# Patient Record
Sex: MALE | Race: WHITE | URBAN - METROPOLITAN AREA
[De-identification: names, ages, dates, MRNs, and addresses within clinical notes are randomized per-mention and may not be internally consistent; named-entity substitution may affect disease eponyms.]

---

## 2018-06-01 ENCOUNTER — TELEPHONE (OUTPATIENT)
Dept: FAMILY MEDICINE | Facility: CLINIC | Age: 43
End: 2018-06-01

## 2018-06-01 NOTE — TELEPHONE ENCOUNTER
Reason for Call: Request for an order or referral:    Order or referral being requested: Physical Therapy    Date needed: as soon as possible    Has the patient been seen by the PCP for this problem? NO    Additional comments:    Phone number Patient can be reached at:  Home number on file mother Perdomo [phone]    Best Time:  Anytime     Can we leave a detailed message on this number?  YES    Call taken on 6/1/2018 at 2:01 PM by Alona Douglas

## 2018-06-04 NOTE — TELEPHONE ENCOUNTER
Scheduled patient for Friday 6/8 at 3 pm.    The patient was satisfied with this answer and agreed to the plan.

## 2018-06-12 ENCOUNTER — OFFICE VISIT (OUTPATIENT)
Dept: ORTHOPEDICS | Facility: CLINIC | Age: 43
End: 2018-06-12
Payer: COMMERCIAL

## 2018-06-12 VITALS
DIASTOLIC BLOOD PRESSURE: 70 MMHG | WEIGHT: 160 LBS | HEIGHT: 70 IN | BODY MASS INDEX: 22.9 KG/M2 | SYSTOLIC BLOOD PRESSURE: 111 MMHG

## 2018-06-12 DIAGNOSIS — Z98.890 H/O LUMBOSACRAL SPINE SURGERY: ICD-10-CM

## 2018-06-12 DIAGNOSIS — M54.50 CHRONIC MIDLINE LOW BACK PAIN WITHOUT SCIATICA: Primary | ICD-10-CM

## 2018-06-12 DIAGNOSIS — G89.29 CHRONIC MIDLINE LOW BACK PAIN WITHOUT SCIATICA: Primary | ICD-10-CM

## 2018-06-12 DIAGNOSIS — M51.369 DDD (DEGENERATIVE DISC DISEASE), LUMBAR: ICD-10-CM

## 2018-06-12 PROCEDURE — 99213 OFFICE O/P EST LOW 20 MIN: CPT | Performed by: FAMILY MEDICINE

## 2018-06-12 ASSESSMENT — ENCOUNTER SYMPTOMS
FOCAL WEAKNESS: 0
TINGLING: 0
BACK PAIN: 1
SENSORY CHANGE: 0

## 2018-06-12 NOTE — LETTER
2018         RE: Williams Bowen  [address]  [place] [address]        Dear Colleague,    Thank you for referring your patient, Williams Bowen, to the [facility]. Please see a copy of my visit note below.            Physical Exam  There were no vitals taken for this visit.  Constitutional:well-developed, well-nourished, and in no distress.   Cardiovascular: Intact distal pulses.    Neurological: alert. Gait normal.   Skin: Skin is warm and dry.   Psychiatric: Mood and affect normal.     diastasis recti noted with changes in posture.  Hyperlordotic posture    Results for orders placed or performed in visit on 14   MRI Imaging - HIM Scan    Narrative    MRI OF THE LUMBAR SPINE- [facility]       Impression      [facility]  Patient: Williams Bowen     : 1975    Sex: Male    Phone: [phone]    [facility] MRN: [id number]   Group MRN: [id number] EDIT       Exam Date: 2014    Referring Physician: Derek Dao M.D.       Phone: [phone]    Fax: [phone]            EXAM:  MRI OF THE LUMBAR SPINE    CLINICAL INFORMATION: Bilateral low back and anterior thigh pain.   History of previous surgery on 12/10/2010.       TECHNICAL INFORMATION: T1, T2 FSE and STIR sagittal thin sections  through the lumbar spine with T2 FSE Coronal sections, and T1/T2  FSE axial sections at selected levels.  Comparison with  04/10/2012.      INTERPRETATION: Lordotic alignment of five lumbar-type vertebrae.   No scoliosis, spondylolysis or spondylolisthesis.  Sacrum and  sacroiliac joints are unremarkable.       L5-S1: Moderate disc degeneration with a laminotomy defect on the  left.  A 5 mm broad-based central and left paracentral disc  herniation mildly deforms the ventral dural sac and abuts the  left S1 nerve root without nerve root displacement or  compression.  Moderate foraminal stenosis bilaterally.  Mild  right facet arthrosis.      L4-5: Mild disc  degeneration with dorsal annular fissure/bulging,  mild relative narrowing of the central canal and mild narrowing  of the right neural foramen.  Normal facet joints.     L3-4 through T11-12: Normal intervertebral disc at each level  with mild facet arthrosis on the left at L3-4 and no stenosis or  impingement.      Normal signal intensity within the distal conus medullaris.  No  intradural mass and no arachnoidal adhesions.     Signal within the vertebral marrow spaces is unremarkable with  mild discogenic marrow edema at L5-S1.  No destructive bone  lesion and no paraspinous soft tissue mass.      CONCLUSION: L5-S1 and L4-5 disc degeneration with a laminotomy  defect on the left at L5-S1 and specific findings as follows:  1. Moderate L5-S1 disc degeneration with a 5 mm broad-based  central/left paracentral disc herniation, dural sac deformity and  left S1 nerve root abutment.   2. Mild L4-5 disc degeneration with dorsal annular  fissure/bulging and mild narrowing of the central canal.    3. Foraminal stenosis, moderate bilaterally at L5-S1 and mild on  the right at L4-5.    4. Comparison with 04/10/2012 shows that the L5-S1 disc  herniation has increased in size.    TJG:tb  Signed on 05/22/14 04:49 PM By Esvin Joy M.D.                  Electronically filed by Judit Quarles     5/27/2014  10:44 AM             Back Pain    Pertinent negatives include no tingling.    [facility]   Follow-up Visit s Jun 12, 2018    PCP: Avelino Mcintosh      Subjective:  Williams is a 43 year old male who is seen in follow up for evaluation of   Chief Complaint   Patient presents with     Back Pain     His last visit was on 12/21/15.  Since that time, symptoms have been moderately better. Williams Bowen is accompanied today by mother.      Patient reports 75% improvement since last visit. [place] [facility] [place] [place] [facility] [place] since last visit. Takes occasional ibuprofen.     Patient has  noticed improved symptoms with physical therapy treatment.    Not walking as much as he used to. Only to and from work.     Pain is located bilateral low back, waxing and waning.  Patient is using no aids.    Patient denies any new injuries.    Continues 8 hour work day. Has sit to stand desk at work.     The physician at [facility] stated that he didn't think continuing treatment would help at this point. He has also had a surgical consult with [facility] and they state that he is too young to consider spinal fusion.     MARCELO prior to surgery in 2010 didn't help much. Repeated 4/14 at L4-5, no benefit.    Chronic LBP, laminectomy in 2010.    Asperger's syndrome, disabled, spends most time sitting at computer/playing video games. [place] work days - 8 hours - seems to help.    PT on multiple occasions unsuccessful, pt's disability limits compliance.     Past Medical History:   Diagnosis Date     Annular tear of intervertebral disc 10/29/2010     Asperger's disorder     f/b counselor     ATTN DEFICIT NONHYPERACT 3/26/2004     Colon polyp 4/09     COPD, mild (H) 2/16/2011     Cubital tunnel syndrome 2011    left elbow     Depressive disorder, 5/23/2006    f/b psychology     Fidgeting 8/24/2011     Former smoker 5/2010    7 pack year     Gluten enteropathy 4/09     Irritable bowel syndrome     evaluated by GI 2004     Rectal polyp      Scoliosis      Underweight        Patient Active Problem List    Diagnosis Date Noted     Generalized anxiety disorder 06/18/2013     Priority: High     Diagnosis updated by automated process. Provider to review and confirm.       Mild major depression (H) 08/20/2010     Priority: High     Asperger's disorder      Priority: High     f/b counselor       H/O lumbosacral spine surgery 06/12/2018     Priority: Medium     Chronic midline low back pain without sciatica 06/12/2018     Priority: Medium     Low back pain 02/28/2014     Priority: Medium     Diagnosis updated by automated process. Provider  to review and confirm.       Former smoker 08/22/2013     Priority: Medium     Cubital tunnel syndrome      Priority: Medium     left elbow       Ulnar neuropathy 09/27/2011     Priority: Medium     CARDIOVASCULAR SCREENING; LDL GOAL LESS THAN 160 10/31/2010     Priority: Medium     DDD (degenerative disc disease), lumbar 10/13/2010     Priority: Medium     Irritable bowel syndrome      Priority: Medium     evaluated by GI 2004       Gluten enteropathy      Priority: Medium     RLS (restless legs syndrome) 03/17/2009     Priority: Medium     Attention deficit disorder 03/26/2004     Priority: Medium     Problem list name updated by automated process. Provider to review         Family History   Problem Relation Age of Onset     C.A.D. Maternal Grandfather      DIABETES Paternal Grandfather      Hypertension Paternal Grandmother      CANCER Paternal Grandfather      skin Cancer     GASTROINTESTINAL DISEASE Maternal Grandmother      Celiac Disease       Social History     Social History     Marital status:      Spouse name: monica     Number of children: 0     Years of education: 13     Occupational History     computer,  [employer]     Social History Main Topics     Smoking status: Former Smoker     Packs/day: 0.50     Years: 14.00     Types: Cigarettes     Quit date: 5/20/2010     Smokeless tobacco: Never Used      Comment: quit via breath laser treatment     Alcohol use Yes      Comment: Less than once drink per week     Drug use: No     Sexual activity: No     Other Topics Concern      Service No     Blood Transfusions No     Caffeine Concern No     4-5 diet pops daily,occasional coffee     Occupational Exposure No     Hobby Hazards No     Sleep Concern Yes     restless legs     Stress Concern No     Weight Concern No     Special Diet No     Back Care No     Exercise Yes     walking try little bit during day     Bike Helmet No     No Bike     Seat Belt Yes     Self-Exams Yes     Social  "History Narrative       Past Surgical History:   Procedure Laterality Date     HC COLONOSCOPY THRU STOMA W BIOPSY/CAUTERY TUMOR/POLYP/LESION      small rectal polyp removed, biopsies neg     HC ORCHIOPEXY,INGUNIAL APPROACH       SURGICAL HISTORY OF -   12/10    Left L5-S1 microdiscectomy with annular repair     UPPER GI ENDOSCOPY      negative including biopsies       Review of Systems   Musculoskeletal: Positive for back pain.   Neurological: Negative for tingling, sensory change and focal weakness.   All other systems reviewed and are negative.    Physical Exam     /70  Ht 5' 9.5\" (1.765 m)  Wt 160 lb (72.6 kg)  BMI 23.29 kg/m2  Constitutional:well-developed, well-nourished, and in no distress.   Cardiovascular: Intact distal pulses.    Neurological: alert. Gait shuffling, difficulty transitioning from sit to stand  Skin: Skin is warm and dry.   Psychiatric: Mood and affect normal.   Respiratory: unlabored, speaks in full sentences  Lymph: no LAD, no lymphangitis      Back Exam   Sensation: Normal.  Gait: Antalgic.    Tenderness   None    Range of Motion   Flexion:                    40  Extension:                Normal  Lateral Bend Left:    Normal  Lateral Bend Right:  Normal  Rotation Right:         Normal  Rotation Left:           Normal  SLR    Right: Negative  Left:    Negative    Muscle Strength   Normal back strength    Tests   Toe Walk: Normal  Heel Walk: Normal    Reflexes   Patellar:  Normal  Achilles:  Normal    Comments:  FF hands to lower thighs              Results for orders placed or performed in visit on 14   MRI Imaging - HIM Scan    Narrative    MRI OF THE LUMBAR SPINE- [facility]       Impression      [facility]  Patient: Williams Bowen     : 1975    Sex: Male    Phone: [phone]    [facility] MRN: [id number]   Group MRN: [id number] EDIT       Exam Date: 2014    Referring Physician: Derek Dao M.D.       Phone: [phone]    Fax: [phone] "            EXAM:  MRI OF THE LUMBAR SPINE    CLINICAL INFORMATION: Bilateral low back and anterior thigh pain.   History of previous surgery on 12/10/2010.       TECHNICAL INFORMATION: T1, T2 FSE and STIR sagittal thin sections  through the lumbar spine with T2 FSE Coronal sections, and T1/T2  FSE axial sections at selected levels.  Comparison with  04/10/2012.      INTERPRETATION: Lordotic alignment of five lumbar-type vertebrae.   No scoliosis, spondylolysis or spondylolisthesis.  Sacrum and  sacroiliac joints are unremarkable.       L5-S1: Moderate disc degeneration with a laminotomy defect on the  left.  A 5 mm broad-based central and left paracentral disc  herniation mildly deforms the ventral dural sac and abuts the  left S1 nerve root without nerve root displacement or  compression.  Moderate foraminal stenosis bilaterally.  Mild  right facet arthrosis.      L4-5: Mild disc degeneration with dorsal annular fissure/bulging,  mild relative narrowing of the central canal and mild narrowing  of the right neural foramen.  Normal facet joints.     L3-4 through T11-12: Normal intervertebral disc at each level  with mild facet arthrosis on the left at L3-4 and no stenosis or  impingement.      Normal signal intensity within the distal conus medullaris.  No  intradural mass and no arachnoidal adhesions.     Signal within the vertebral marrow spaces is unremarkable with  mild discogenic marrow edema at L5-S1.  No destructive bone  lesion and no paraspinous soft tissue mass.      CONCLUSION: L5-S1 and L4-5 disc degeneration with a laminotomy  defect on the left at L5-S1 and specific findings as follows:  1. Moderate L5-S1 disc degeneration with a 5 mm broad-based  central/left paracentral disc herniation, dural sac deformity and  left S1 nerve root abutment.   2. Mild L4-5 disc degeneration with dorsal annular  fissure/bulging and mild narrowing of the central canal.    3. Foraminal stenosis, moderate bilaterally at  L5-S1 and mild on  the right at L4-5.    4. Comparison with 04/10/2012 shows that the L5-S1 disc  herniation has increased in size.    TJG:tb  Signed on 05/22/14 04:49 PM By Esvin Joy M.D.  Electronically filed by Judit Quarles     5/27/2014  10:44 AM         ASSESSMENT/PLAN    ICD-10-CM    1. Chronic midline low back pain without sciatica M54.5 PHYSICAL THERAPY REFERRAL    G89.29    2. DDD (degenerative disc disease), lumbar M51.36 PHYSICAL THERAPY REFERRAL   3. H/O lumbosacral spine surgery Z98.890 PHYSICAL THERAPY REFERRAL     Long term LBP, stable with long term manual therapy, following extensive medication, therapy, surgery, and injection attempts a few years ago    Ok to renew therapy today; recheck annually, or if condition changes or worsens.    Reinforced 30 minutes of regular exercise daily.     Again, thank you for allowing me to participate in the care of your patient.        Sincerely,        Derek Dao MD

## 2018-06-12 NOTE — PROGRESS NOTES
Physical Exam  There were no vitals taken for this visit.  Constitutional:well-developed, well-nourished, and in no distress.   Cardiovascular: Intact distal pulses.    Neurological: alert. Gait normal.   Skin: Skin is warm and dry.   Psychiatric: Mood and affect normal.     diastasis recti noted with changes in posture.  Hyperlordotic posture    Results for orders placed or performed in visit on 14   MRI Imaging - HIM Scan    Narrative    MRI OF THE LUMBAR SPINE- [facility]       Impression      [facility]  Patient: Williams Bowen JUSTIN CARRERAB.: 1975    Sex: Male    Phone: [phone]    [facility] MRN: [id number]   Group MRN: [id number] EDIT       Exam Date: 2014    Referring Physician: Derek Dao M.D.       Phone: [phone]    Fax: [phone]            EXAM:  MRI OF THE LUMBAR SPINE    CLINICAL INFORMATION: Bilateral low back and anterior thigh pain.   History of previous surgery on 12/10/2010.       TECHNICAL INFORMATION: T1, T2 FSE and STIR sagittal thin sections  through the lumbar spine with T2 FSE Coronal sections, and T1/T2  FSE axial sections at selected levels.  Comparison with  04/10/2012.      INTERPRETATION: Lordotic alignment of five lumbar-type vertebrae.   No scoliosis, spondylolysis or spondylolisthesis.  Sacrum and  sacroiliac joints are unremarkable.       L5-S1: Moderate disc degeneration with a laminotomy defect on the  left.  A 5 mm broad-based central and left paracentral disc  herniation mildly deforms the ventral dural sac and abuts the  left S1 nerve root without nerve root displacement or  compression.  Moderate foraminal stenosis bilaterally.  Mild  right facet arthrosis.      L4-5: Mild disc degeneration with dorsal annular fissure/bulging,  mild relative narrowing of the central canal and mild narrowing  of the right neural foramen.  Normal facet joints.     L3-4 through T11-12: Normal intervertebral disc at each level  with mild facet arthrosis on the  left at L3-4 and no stenosis or  impingement.      Normal signal intensity within the distal conus medullaris.  No  intradural mass and no arachnoidal adhesions.     Signal within the vertebral marrow spaces is unremarkable with  mild discogenic marrow edema at L5-S1.  No destructive bone  lesion and no paraspinous soft tissue mass.      CONCLUSION: L5-S1 and L4-5 disc degeneration with a laminotomy  defect on the left at L5-S1 and specific findings as follows:  1. Moderate L5-S1 disc degeneration with a 5 mm broad-based  central/left paracentral disc herniation, dural sac deformity and  left S1 nerve root abutment.   2. Mild L4-5 disc degeneration with dorsal annular  fissure/bulging and mild narrowing of the central canal.    3. Foraminal stenosis, moderate bilaterally at L5-S1 and mild on  the right at L4-5.    4. Comparison with 04/10/2012 shows that the L5-S1 disc  herniation has increased in size.    TJG:tb  Signed on 05/22/14 04:49 PM By Esvin Joy M.D.                  Electronically filed by Judit Quarles     5/27/2014  10:44 AM             Back Pain    Pertinent negatives include no tingling.    [facility]   Follow-up Visit s Jun 12, 2018    PCP: Avelino Mcintosh      Subjective:  Williams is a 43 year old male who is seen in follow up for evaluation of   Chief Complaint   Patient presents with     Back Pain     His last visit was on 12/21/15.  Since that time, symptoms have been moderately better. Williams Bwoen is accompanied today by mother.      Patient reports 75% improvement since last visit. regularly sees Chang Lr at [facility] 2x monthly since last visit. Takes occasional ibuprofen.     Patient has noticed improved symptoms with physical therapy treatment.    Not walking as much as he used to. Only to and from work.     Pain is located bilateral low back, waxing and waning.  Patient is using no aids.    Patient denies any new injuries.    Continues 8 hour work day.  Has sit to stand desk at work.     The physician at [facility] stated that he didn't think continuing treatment would help at this point. He has also had a surgical consult with [facility] and they state that he is too young to consider spinal fusion.     MARCELO prior to surgery in 2010 didn't help much. Repeated 4/14 at L4-5, no benefit.    Chronic LBP, laminectomy in 2010.    Asperger's syndrome, disabled, spends most time sitting at computer/playing video games. [place] work days - 8 hours - seems to help.    PT on multiple occasions unsuccessful, pt's disability limits compliance.     Past Medical History:   Diagnosis Date     Annular tear of intervertebral disc 10/29/2010     Asperger's disorder     f/b counselor     ATTN DEFICIT NONHYPERACT 3/26/2004     Colon polyp 4/09     COPD, mild (H) 2/16/2011     Cubital tunnel syndrome 2011    left elbow     Depressive disorder, 5/23/2006    f/b psychology     Fidgeting 8/24/2011     Former smoker 5/2010    7 pack year     Gluten enteropathy 4/09     Irritable bowel syndrome     evaluated by GI 2004     Rectal polyp      Scoliosis      Underweight        Patient Active Problem List    Diagnosis Date Noted     Generalized anxiety disorder 06/18/2013     Priority: High     Diagnosis updated by automated process. Provider to review and confirm.       Mild major depression (H) 08/20/2010     Priority: High     Asperger's disorder      Priority: High     f/b counselor       H/O lumbosacral spine surgery 06/12/2018     Priority: Medium     Chronic midline low back pain without sciatica 06/12/2018     Priority: Medium     Low back pain 02/28/2014     Priority: Medium     Diagnosis updated by automated process. Provider to review and confirm.       Former smoker 08/22/2013     Priority: Medium     Cubital tunnel syndrome      Priority: Medium     left elbow       Ulnar neuropathy 09/27/2011     Priority: Medium     CARDIOVASCULAR SCREENING; LDL GOAL LESS THAN 160 10/31/2010     Priority:  Medium     DDD (degenerative disc disease), lumbar 10/13/2010     Priority: Medium     Irritable bowel syndrome      Priority: Medium     evaluated by GI 2004       Gluten enteropathy      Priority: Medium     RLS (restless legs syndrome) 03/17/2009     Priority: Medium     Attention deficit disorder 03/26/2004     Priority: Medium     Problem list name updated by automated process. Provider to review         Family History   Problem Relation Age of Onset     C.A.D. Maternal Grandfather      DIABETES Paternal Grandfather      Hypertension Paternal Grandmother      CANCER Paternal Grandfather      skin Cancer     GASTROINTESTINAL DISEASE Maternal Grandmother      Celiac Disease       Social History     Social History     Marital status:      Spouse name: mnoica     Number of children: 0     Years of education: 13     Occupational History     computer,  [employer]     Social History Main Topics     Smoking status: Former Smoker     Packs/day: 0.50     Years: 14.00     Types: Cigarettes     Quit date: 5/20/2010     Smokeless tobacco: Never Used      Comment: quit via breath laser treatment     Alcohol use Yes      Comment: Less than once drink per week     Drug use: No     Sexual activity: No     Other Topics Concern      Service No     Blood Transfusions No     Caffeine Concern No     4-5 diet pops daily,occasional coffee     Occupational Exposure No     Hobby Hazards No     Sleep Concern Yes     restless legs     Stress Concern No     Weight Concern No     Special Diet No     Back Care No     Exercise Yes     walking try little bit during day     Bike Helmet No     No Bike     Seat Belt Yes     Self-Exams Yes     Social History Narrative       Past Surgical History:   Procedure Laterality Date     HC COLONOSCOPY THRU STOMA W BIOPSY/CAUTERY TUMOR/POLYP/LESION  4/09    small rectal polyp removed, biopsies neg     HC ORCHIOPEXY,INGUNIAL APPROACH  1983     SURGICAL HISTORY OF -   12/10    Left  "L5-S1 microdiscectomy with annular repair     UPPER GI ENDOSCOPY      negative including biopsies       Review of Systems   Musculoskeletal: Positive for back pain.   Neurological: Negative for tingling, sensory change and focal weakness.   All other systems reviewed and are negative.    Physical Exam     /70  Ht 5' 9.5\" (1.765 m)  Wt 160 lb (72.6 kg)  BMI 23.29 kg/m2  Constitutional:well-developed, well-nourished, and in no distress.   Cardiovascular: Intact distal pulses.    Neurological: alert. Gait shuffling, difficulty transitioning from sit to stand  Skin: Skin is warm and dry.   Psychiatric: Mood and affect normal.   Respiratory: unlabored, speaks in full sentences  Lymph: no LAD, no lymphangitis      Back Exam   Sensation: Normal.  Gait: Antalgic.    Tenderness   None    Range of Motion   Flexion:                    40  Extension:                Normal  Lateral Bend Left:    Normal  Lateral Bend Right:  Normal  Rotation Right:         Normal  Rotation Left:           Normal  SLR    Right: Negative  Left:    Negative    Muscle Strength   Normal back strength    Tests   Toe Walk: Normal  Heel Walk: Normal    Reflexes   Patellar:  Normal  Achilles:  Normal    Comments:  FF hands to lower thighs              Results for orders placed or performed in visit on 14   MRI Imaging - HIM Scan    Narrative    MRI OF THE LUMBAR SPINE- [facility]       Impression      [facility]  Patient: Williams Bowen     : 1975    Sex: Male    Phone: [phone]    [facility] MRN: [id number]   Group MRN: [id number] EDIT       Exam Date: 2014    Referring Physician: Derek Dao M.D.       Phone: [phone]    Fax: [phone]            EXAM:  MRI OF THE LUMBAR SPINE    CLINICAL INFORMATION: Bilateral low back and anterior thigh pain.   History of previous surgery on 12/10/2010.       TECHNICAL INFORMATION: T1, T2 FSE and STIR sagittal thin sections  through the lumbar spine with T2 FSE Coronal " sections, and T1/T2  FSE axial sections at selected levels.  Comparison with  04/10/2012.      INTERPRETATION: Lordotic alignment of five lumbar-type vertebrae.   No scoliosis, spondylolysis or spondylolisthesis.  Sacrum and  sacroiliac joints are unremarkable.       L5-S1: Moderate disc degeneration with a laminotomy defect on the  left.  A 5 mm broad-based central and left paracentral disc  herniation mildly deforms the ventral dural sac and abuts the  left S1 nerve root without nerve root displacement or  compression.  Moderate foraminal stenosis bilaterally.  Mild  right facet arthrosis.      L4-5: Mild disc degeneration with dorsal annular fissure/bulging,  mild relative narrowing of the central canal and mild narrowing  of the right neural foramen.  Normal facet joints.     L3-4 through T11-12: Normal intervertebral disc at each level  with mild facet arthrosis on the left at L3-4 and no stenosis or  impingement.      Normal signal intensity within the distal conus medullaris.  No  intradural mass and no arachnoidal adhesions.     Signal within the vertebral marrow spaces is unremarkable with  mild discogenic marrow edema at L5-S1.  No destructive bone  lesion and no paraspinous soft tissue mass.      CONCLUSION: L5-S1 and L4-5 disc degeneration with a laminotomy  defect on the left at L5-S1 and specific findings as follows:  1. Moderate L5-S1 disc degeneration with a 5 mm broad-based  central/left paracentral disc herniation, dural sac deformity and  left S1 nerve root abutment.   2. Mild L4-5 disc degeneration with dorsal annular  fissure/bulging and mild narrowing of the central canal.    3. Foraminal stenosis, moderate bilaterally at L5-S1 and mild on  the right at L4-5.    4. Comparison with 04/10/2012 shows that the L5-S1 disc  herniation has increased in size.    TJG:tb  Signed on 05/22/14 04:49 PM By Esvin Joy M.D.  Electronically filed by Judit Quarles     5/27/2014  10:44 AM          ASSESSMENT/PLAN    ICD-10-CM    1. Chronic midline low back pain without sciatica M54.5 PHYSICAL THERAPY REFERRAL    G89.29    2. DDD (degenerative disc disease), lumbar M51.36 PHYSICAL THERAPY REFERRAL   3. H/O lumbosacral spine surgery Z98.890 PHYSICAL THERAPY REFERRAL     Long term LBP, stable with long term manual therapy, following extensive medication, therapy, surgery, and injection attempts a few years ago    Ok to renew therapy today; recheck annually, or if condition changes or worsens.    Reinforced 30 minutes of regular exercise daily.

## 2018-06-12 NOTE — MR AVS SNAPSHOT
After Visit Summary   6/12/2018    Williams Bowen    MRN: [id number]           Patient Information     Date Of Birth          1975        Visit Information        Provider Department      6/12/2018 8:00 AM Derek Dao MD [facility] [place]        Today's Diagnoses     Chronic midline low back pain without sciatica    -  1    DDD (degenerative disc disease), lumbar        H/O lumbosacral spine surgery           Follow-ups after your visit        Additional Services     PHYSICAL THERAPY REFERRAL       [place] [address]  [phone]  Fax: [phone]            Chronic midline low back pain without sciatica  DDD (degenerative disc disease), lumbar  H/O lumbosacral spine surgery    EVAL AND TREAT    PRN VISITS                  Who to contact     If you have questions or need follow up information about today's clinic visit or your schedule please contact [facility] [place] directly at [phone].  Normal or non-critical lab and imaging results will be communicated to you by [employer], letter or phone within 4 business days after the clinic has received the results. If you do not hear from us within 7 days, please contact the clinic through [employer] or phone. If you have a critical or abnormal lab result, we will notify you by phone as soon as possible.  Submit refill requests through [employer] or call your pharmacy and they will forward the refill request to us. Please allow 3 business days for your refill to be completed.          Additional Information About Your Visit        MyChart Information     [employer] gives you secure access to your electronic health record. If you see a primary care provider, you can also send messages to your care team and make appointments. If you have questions, please call your primary care clinic.  If you do not have a primary care provider,  "please call [phone] and they will assist you.        Care EveryWhere ID     This is your Care EveryWhere ID. This could be used by other organizations to access your [facility] medical records  [id number]        Your Vitals Were     Height BMI (Body Mass Index)                5' 9.5\" (1.765 m) 23.29 kg/m2           Blood Pressure from Last 3 Encounters:   06/12/18 111/70   12/21/15 111/70   10/13/14 128/75    Weight from Last 3 Encounters:   06/12/18 160 lb (72.6 kg)   12/21/15 176 lb (79.8 kg)   10/13/14 160 lb (72.6 kg)              We Performed the Following     PHYSICAL THERAPY REFERRAL          Today's Medication Changes          These changes are accurate as of 6/12/18  9:12 AM.  If you have any questions, ask your nurse or doctor.               These medicines have changed or have updated prescriptions.        Dose/Directions    sertraline 100 MG tablet   Commonly known as:  ZOLOFT   This may have changed:    - how much to take  - when to take this  - additional instructions   Used for:  Mild major depression (H), STORM (generalised anxiety disorder)        Dose:  150 mg   Take 1.5 tablets (150 mg) by mouth daily follow up appointment in August   Quantity:  45 tablet   Refills:  3                Primary Care Provider Office Phone # Fax #    Avelino Aniket Mcintosh [phone]       [address]  [facility] [address]        Equal Access to Services     [facility]: Hadii aris wadeo Sostacey, waaxda luqadaha, qaybta kaalmada sim, waxjunaid ren hernandez. So [facility] [phone].    ATENCIÓN: Si habla español, tiene a morales disposición servicios gratuitos de asistencia lingüística. Llame al [phone].    We comply with applicable federal civil rights laws and [place] laws. We do not discriminate on the basis of race, color, national origin, age, disability, sex, sexual orientation, or gender identity.            Thank you!     Thank you " for choosing [facility] [place] [facility]  for your care. Our goal is always to provide you with excellent care. Hearing back from our patients is one way we can continue to improve our services. Please take a few minutes to complete the written survey that you may receive in the mail after your visit with us. Thank you!             Your Updated Medication List - Protect others around you: Learn how to safely use, store and throw away your medicines at www.disposemymeds.org.          This list is accurate as of 6/12/18  9:12 AM.  Always use your most recent med list.                   Brand Name Dispense Instructions for use Diagnosis    DICYCLOMINE HCL PO      Take 20 mg by mouth        sertraline 100 MG tablet    ZOLOFT    45 tablet    Take 1.5 tablets (150 mg) by mouth daily follow up appointment in August    Mild major depression (H), STORM (generalised anxiety disorder)       SIMVASTATIN PO      Take 10 mg by mouth At Bedtime

## 2019-09-28 ENCOUNTER — HEALTH MAINTENANCE LETTER (OUTPATIENT)
Age: 44
End: 2019-09-28

## 2020-03-23 ENCOUNTER — DOCUMENTATION ONLY (OUTPATIENT)
Dept: OTHER | Facility: CLINIC | Age: 45
End: 2020-03-23

## 2021-01-10 ENCOUNTER — HEALTH MAINTENANCE LETTER (OUTPATIENT)
Age: 46
End: 2021-01-10

## 2021-10-23 ENCOUNTER — HEALTH MAINTENANCE LETTER (OUTPATIENT)
Age: 46
End: 2021-10-23

## 2022-02-12 ENCOUNTER — HEALTH MAINTENANCE LETTER (OUTPATIENT)
Age: 47
End: 2022-02-12

## 2022-10-09 ENCOUNTER — HEALTH MAINTENANCE LETTER (OUTPATIENT)
Age: 47
End: 2022-10-09

## 2023-02-18 ENCOUNTER — HEALTH MAINTENANCE LETTER (OUTPATIENT)
Age: 48
End: 2023-02-18

## 2024-03-16 ENCOUNTER — HEALTH MAINTENANCE LETTER (OUTPATIENT)
Age: 49
End: 2024-03-16

## 2024-06-18 ENCOUNTER — ANESTHESIA EVENT (OUTPATIENT)
Dept: SURGERY | Facility: AMBULATORY SURGERY CENTER | Age: 49
End: 2024-06-18
Payer: COMMERCIAL

## 2024-06-25 RX ORDER — MONTELUKAST SODIUM 4 MG/1
2 TABLET, CHEWABLE ORAL DAILY
COMMUNITY

## 2024-06-25 RX ORDER — BUSPIRONE HYDROCHLORIDE 15 MG/1
15 TABLET ORAL 2 TIMES DAILY
COMMUNITY

## 2024-06-27 ENCOUNTER — HOSPITAL ENCOUNTER (OUTPATIENT)
Facility: AMBULATORY SURGERY CENTER | Age: 49
Discharge: HOME | End: 2024-06-27
Attending: DENTIST
Payer: COMMERCIAL

## 2024-06-27 ENCOUNTER — ANESTHESIA (OUTPATIENT)
Dept: SURGERY | Facility: AMBULATORY SURGERY CENTER | Age: 49
End: 2024-06-27
Payer: COMMERCIAL

## 2024-06-27 VITALS
BODY MASS INDEX: 22.22 KG/M2 | HEIGHT: 69 IN | WEIGHT: 150 LBS | OXYGEN SATURATION: 92 % | HEART RATE: 68 BPM | DIASTOLIC BLOOD PRESSURE: 63 MMHG | RESPIRATION RATE: 16 BRPM | TEMPERATURE: 97.4 F | SYSTOLIC BLOOD PRESSURE: 129 MMHG

## 2024-06-27 RX ORDER — DEXAMETHASONE SODIUM PHOSPHATE 4 MG/ML
4 INJECTION, SOLUTION INTRA-ARTICULAR; INTRALESIONAL; INTRAMUSCULAR; INTRAVENOUS; SOFT TISSUE
Status: DISCONTINUED | OUTPATIENT
Start: 2024-06-27 | End: 2024-06-29 | Stop reason: HOSPADM

## 2024-06-27 RX ORDER — LIDOCAINE HYDROCHLORIDE 20 MG/ML
INJECTION, SOLUTION INFILTRATION; PERINEURAL PRN
Status: DISCONTINUED | OUTPATIENT
Start: 2024-06-27 | End: 2024-06-27

## 2024-06-27 RX ORDER — CEFAZOLIN SODIUM/WATER 2 G/20 ML
SYRINGE (ML) INTRAVENOUS PRN
Status: DISCONTINUED | OUTPATIENT
Start: 2024-06-27 | End: 2024-06-27

## 2024-06-27 RX ORDER — ACETAMINOPHEN 325 MG/1
975 TABLET ORAL ONCE
Status: COMPLETED | OUTPATIENT
Start: 2024-06-27 | End: 2024-06-27

## 2024-06-27 RX ORDER — ONDANSETRON 4 MG/1
4 TABLET, ORALLY DISINTEGRATING ORAL EVERY 30 MIN PRN
Status: DISCONTINUED | OUTPATIENT
Start: 2024-06-27 | End: 2024-06-29 | Stop reason: HOSPADM

## 2024-06-27 RX ORDER — FENTANYL CITRATE 0.05 MG/ML
25 INJECTION, SOLUTION INTRAMUSCULAR; INTRAVENOUS EVERY 5 MIN PRN
Status: DISCONTINUED | OUTPATIENT
Start: 2024-06-27 | End: 2024-06-29 | Stop reason: HOSPADM

## 2024-06-27 RX ORDER — LIDOCAINE 40 MG/G
CREAM TOPICAL
Status: DISCONTINUED | OUTPATIENT
Start: 2024-06-27 | End: 2024-06-29 | Stop reason: HOSPADM

## 2024-06-27 RX ORDER — ONDANSETRON 2 MG/ML
INJECTION INTRAMUSCULAR; INTRAVENOUS PRN
Status: DISCONTINUED | OUTPATIENT
Start: 2024-06-27 | End: 2024-06-27

## 2024-06-27 RX ORDER — OXYCODONE HYDROCHLORIDE 5 MG/1
5 TABLET ORAL
Status: COMPLETED | OUTPATIENT
Start: 2024-06-27 | End: 2024-06-27

## 2024-06-27 RX ORDER — ONDANSETRON 2 MG/ML
4 INJECTION INTRAMUSCULAR; INTRAVENOUS EVERY 30 MIN PRN
Status: DISCONTINUED | OUTPATIENT
Start: 2024-06-27 | End: 2024-06-29 | Stop reason: HOSPADM

## 2024-06-27 RX ORDER — PROPOFOL 10 MG/ML
INJECTION, EMULSION INTRAVENOUS CONTINUOUS PRN
Status: DISCONTINUED | OUTPATIENT
Start: 2024-06-27 | End: 2024-06-27

## 2024-06-27 RX ORDER — HYDROMORPHONE HCL IN WATER/PF 6 MG/30 ML
0.4 PATIENT CONTROLLED ANALGESIA SYRINGE INTRAVENOUS EVERY 5 MIN PRN
Status: DISCONTINUED | OUTPATIENT
Start: 2024-06-27 | End: 2024-06-29 | Stop reason: HOSPADM

## 2024-06-27 RX ORDER — OXYCODONE AND ACETAMINOPHEN 5; 325 MG/1; MG/1
1-2 TABLET ORAL
Status: DISCONTINUED | OUTPATIENT
Start: 2024-06-27 | End: 2024-06-29 | Stop reason: HOSPADM

## 2024-06-27 RX ORDER — PROPOFOL 10 MG/ML
INJECTION, EMULSION INTRAVENOUS PRN
Status: DISCONTINUED | OUTPATIENT
Start: 2024-06-27 | End: 2024-06-27

## 2024-06-27 RX ORDER — LIDOCAINE HYDROCHLORIDE AND EPINEPHRINE BITARTRATE 20; .01 MG/ML; MG/ML
INJECTION, SOLUTION SUBCUTANEOUS PRN
Status: DISCONTINUED | OUTPATIENT
Start: 2024-06-27 | End: 2024-06-27 | Stop reason: HOSPADM

## 2024-06-27 RX ORDER — GLYCOPYRROLATE 0.2 MG/ML
INJECTION, SOLUTION INTRAMUSCULAR; INTRAVENOUS PRN
Status: DISCONTINUED | OUTPATIENT
Start: 2024-06-27 | End: 2024-06-27

## 2024-06-27 RX ORDER — SODIUM CHLORIDE, SODIUM LACTATE, POTASSIUM CHLORIDE, CALCIUM CHLORIDE 600; 310; 30; 20 MG/100ML; MG/100ML; MG/100ML; MG/100ML
INJECTION, SOLUTION INTRAVENOUS CONTINUOUS
Status: DISCONTINUED | OUTPATIENT
Start: 2024-06-27 | End: 2024-06-29 | Stop reason: HOSPADM

## 2024-06-27 RX ORDER — OXYCODONE HYDROCHLORIDE 10 MG/1
10 TABLET ORAL
Status: DISCONTINUED | OUTPATIENT
Start: 2024-06-27 | End: 2024-06-29 | Stop reason: HOSPADM

## 2024-06-27 RX ORDER — NALOXONE HYDROCHLORIDE 0.4 MG/ML
0.1 INJECTION, SOLUTION INTRAMUSCULAR; INTRAVENOUS; SUBCUTANEOUS
Status: DISCONTINUED | OUTPATIENT
Start: 2024-06-27 | End: 2024-06-29 | Stop reason: HOSPADM

## 2024-06-27 RX ORDER — HYDROMORPHONE HCL IN WATER/PF 6 MG/30 ML
0.2 PATIENT CONTROLLED ANALGESIA SYRINGE INTRAVENOUS EVERY 5 MIN PRN
Status: DISCONTINUED | OUTPATIENT
Start: 2024-06-27 | End: 2024-06-29 | Stop reason: HOSPADM

## 2024-06-27 RX ORDER — DEXAMETHASONE SODIUM PHOSPHATE 4 MG/ML
INJECTION, SOLUTION INTRA-ARTICULAR; INTRALESIONAL; INTRAMUSCULAR; INTRAVENOUS; SOFT TISSUE PRN
Status: DISCONTINUED | OUTPATIENT
Start: 2024-06-27 | End: 2024-06-27

## 2024-06-27 RX ORDER — FENTANYL CITRATE 0.05 MG/ML
50 INJECTION, SOLUTION INTRAMUSCULAR; INTRAVENOUS EVERY 5 MIN PRN
Status: DISCONTINUED | OUTPATIENT
Start: 2024-06-27 | End: 2024-06-29 | Stop reason: HOSPADM

## 2024-06-27 RX ORDER — FENTANYL CITRATE 50 UG/ML
INJECTION, SOLUTION INTRAMUSCULAR; INTRAVENOUS PRN
Status: DISCONTINUED | OUTPATIENT
Start: 2024-06-27 | End: 2024-06-27

## 2024-06-27 RX ORDER — IBUPROFEN 600 MG/1
600 TABLET, FILM COATED ORAL
Status: DISCONTINUED | OUTPATIENT
Start: 2024-06-27 | End: 2024-06-29 | Stop reason: HOSPADM

## 2024-06-27 RX ADMIN — DEXAMETHASONE SODIUM PHOSPHATE 10 MG: 4 INJECTION, SOLUTION INTRA-ARTICULAR; INTRALESIONAL; INTRAMUSCULAR; INTRAVENOUS; SOFT TISSUE at 15:29

## 2024-06-27 RX ADMIN — ONDANSETRON 4 MG: 2 INJECTION INTRAMUSCULAR; INTRAVENOUS at 15:29

## 2024-06-27 RX ADMIN — LIDOCAINE HYDROCHLORIDE 3 ML: 20 INJECTION, SOLUTION INFILTRATION; PERINEURAL at 15:29

## 2024-06-27 RX ADMIN — PROPOFOL 200 MG: 10 INJECTION, EMULSION INTRAVENOUS at 15:29

## 2024-06-27 RX ADMIN — PROPOFOL 180 MCG/KG/MIN: 10 INJECTION, EMULSION INTRAVENOUS at 15:29

## 2024-06-27 RX ADMIN — SODIUM CHLORIDE, SODIUM LACTATE, POTASSIUM CHLORIDE, CALCIUM CHLORIDE: 600; 310; 30; 20 INJECTION, SOLUTION INTRAVENOUS at 14:10

## 2024-06-27 RX ADMIN — Medication 2 G: at 15:38

## 2024-06-27 RX ADMIN — GLYCOPYRROLATE 0.4 MG: 0.2 INJECTION, SOLUTION INTRAMUSCULAR; INTRAVENOUS at 15:29

## 2024-06-27 RX ADMIN — Medication 40 MG: at 15:29

## 2024-06-27 RX ADMIN — OXYCODONE HYDROCHLORIDE 5 MG: 5 TABLET ORAL at 17:11

## 2024-06-27 RX ADMIN — FENTANYL CITRATE 100 MCG: 50 INJECTION, SOLUTION INTRAMUSCULAR; INTRAVENOUS at 15:29

## 2024-06-27 RX ADMIN — ACETAMINOPHEN 975 MG: 325 TABLET ORAL at 14:03

## 2024-06-27 ASSESSMENT — ENCOUNTER SYMPTOMS: DYSRHYTHMIAS: 1

## 2024-06-27 ASSESSMENT — LIFESTYLE VARIABLES: TOBACCO_USE: 1

## 2024-06-27 ASSESSMENT — COPD QUESTIONNAIRES: COPD: 0

## 2024-06-27 NOTE — ANESTHESIA POSTPROCEDURE EVALUATION
Patient: Williams Bowen    Procedure: Procedure(s):  SURGICAL EXTRACTION OF TEETH # 2, 3, 5       Anesthesia Type:  General    Note:  Disposition: Outpatient   Postop Pain Control: Uneventful            Sign Out: Well controlled pain   PONV: No   Neuro/Psych: Uneventful            Sign Out: Acceptable/Baseline neuro status   Airway/Respiratory: Uneventful            Sign Out: Acceptable/Baseline resp. status   CV/Hemodynamics: Uneventful            Sign Out: Acceptable CV status; No obvious hypovolemia; No obvious fluid overload   Other NRE: NONE   DID A NON-ROUTINE EVENT OCCUR?            Last vitals:  Vitals Value Taken Time   /58 06/27/24 1628   Temp 97.4  F (36.3  C) 06/27/24 1628   Pulse 84 06/27/24 1628   Resp 14 06/27/24 1628   SpO2 95 % 06/27/24 1628       Electronically Signed By: Gris Mckinnon MD  June 27, 2024  4:30 PM

## 2024-06-27 NOTE — ANESTHESIA PROCEDURE NOTES
Airway       Patient location during procedure: OR       Procedure Start/Stop Times: 6/27/2024 3:35 PM  Staff -        Anesthesiologist:  Gris Urias MD       CRNA: Allison Verduzco APRN CRNA       Performed By: CRNAIndications and Patient Condition       Indications for airway management: yusfe-procedural       Induction type:intravenous       Mask difficulty assessment: 1 - vent by mask    Final Airway Details       Final airway type: endotracheal airway       Successful airway: ETT - single and Oral  Endotracheal Airway Details        ETT size (mm): 7.5       Cuffed: yes       Successful intubation technique: video laryngoscopy       VL Blade Size: Weeks 2       Grade View of Cords: 1       Adjucts: stylet       Position: Right       Measured from: gums/teeth       Secured at (cm): 24       Bite block used: None    Post intubation assessment        Placement verified by: capnometry, equal breath sounds and chest rise        Number of attempts at approach: 1       Secured with: tape       Ease of procedure: easy       Dentition: Intact and Unchanged       Dental guard used and removed.    Medication(s) Administered   Medication Administration Time: 6/27/2024 3:35 PM    Additional Comments       DL with Weeks 2. Unable to reach epiglottis with Weeks 2. Glidescope with ease.

## 2024-06-27 NOTE — OP NOTE
Surgeon: Yimi Hayes DDS     Assistants: Tami Goblisch LDA    Pre Op Diagnosis: Dental Caries    Post op diagnosis: Same    Procedure: extraction of teeth #2,3,5    Indications:   Patient was referred to [facility] for extraction of teeth #2,3,5. Upon clinical and radiographic exam, teeth #2,3,5 were found to have nonrestorable dental caries. Due to the inherent surgical invasiveness of the extractions, general anesthesia was advised. His medical history was significant for an arrhythmia, aspergers, COPD, and an episode under anesthesia previously. Due to these concerns, the patient required general anesthesia in an operating room under supervision of an anesthesiologist. The risks and benefits of the procedure were discussed with the patient and informed consent was obtained.    Procedure: The patient was brought to the OR and placed supine on the OR table. All standard monitors were placed and the patient was induced under GA and was intubated orally without incident. The patient was prepared and draped in the usual fashion for an intraoral procedure. Time out was performed. Bite block and moistened throat pack were placed. Local anesthesia was adminstered via local infiltration #2-6 area. Using a 15 blade a buccal sulcular incision was made from #2-6 and a full thickness mucoperiosteal flap was reflected. Using a bur under saline irrigation, buccal bone was removed at teeth #2, 3, and 5 and teeth #2 and 3 were sectioned. Teeth #2, 3, and 5 were extracted with dental elevators and forceps with all apices intact. The sinus membrane was visualized intact at the sockets of teeth #2 and 3. Rongeur and bone file used to remove any sharp/protrusive bone. Sites were curetted and irrigated with saline. Gelfoam placed at sites #2, 3, 5 and closure was completed with 3-0 chromic gut sutures. Oropharynx was irrigated and suctioned, throat pack and bite block removed. Patient was extubated without incident and transferred to  PACU in stable condition.

## 2024-06-27 NOTE — ANESTHESIA CARE TRANSFER NOTE
Patient: Williams Bowen    Procedure: Procedure(s):  SURGICAL EXTRACTION OF TEETH # 2, 3, 5       Diagnosis: Dental caries on smooth surface penetrating into pulp [K02.63]  Cracked tooth [K03.81]  Diagnosis Additional Information: No value filed.    Anesthesia Type:   General     Note:    Oropharynx: oropharynx clear of all foreign objects  Level of Consciousness: awake and drowsy  Oxygen Supplementation: face mask  Level of Supplemental Oxygen (L/min / FiO2): 8  Independent Airway: airway patency satisfactory and stable  Dentition: dentition changed  Vital Signs Stable: post-procedure vital signs reviewed and stable  Report to RN Given: handoff report given  Patient transferred to: PACU    Handoff Report: Identifed the Patient, Identified the Reponsible Provider, Reviewed the pertinent medical history, Discussed the surgical course, Reviewed Intra-OP anesthesia mangement and issues during anesthesia, Set expectations for post-procedure period and Allowed opportunity for questions and acknowledgement of understanding      Vitals:  Vitals Value Taken Time   /58 06/27/24 1610   Temp 97.6  F (36.4  C) 06/27/24 1610   Pulse 86 06/27/24 1611   Resp 14 06/27/24 1610   SpO2 100 % 06/27/24 1611   Vitals shown include unfiled device data.    Electronically Signed By: VITO Kaplan CRNA  June 27, 2024  4:15 PM

## 2024-06-27 NOTE — BRIEF OP NOTE
Brief Operative Note    Pre-operative diagnosis: Dental caries   Post-operative diagnosis Same   Procedure: Extraction of teeth #2,3,5   Surgeon: Yimi Hayes DDS   Assistants(s): Tami Globlisch LDA   Anesthesia: General    Estimated blood loss: 5cc    Total IV fluids: (See anesthesia record)   Blood transfusion: No transfusion was given during surgery   Total urine output: (See anesthesia record)  None   Drains or packing: None   Specimens: None   Implants: None   Findings: Dental caries   Complications: None   Condition on discharge: Stable  Extubated  Transferred to post-anesthesia recovery   Comments: See dictated operative report for full details

## 2024-06-27 NOTE — PROGRESS NOTES
Pt mother questioned bilateral redness behind jaw. Writer and Phase 1 nurse explained it could possibly be from jaw thrust maneuver during surgery. It has now improved to a light pink. Pt denies pain in jaw area.

## 2024-06-27 NOTE — DISCHARGE INSTRUCTIONS
If you have any questions or concerns regarding your procedure, please contact Dr. Freddy DDS, his office number is [phone].     You have received 975 mg of Acetaminophen (Tylenol) at 2pm. Please do not take an additional dose of Tylenol until after 8pm.     Do not exceed 4,000 mg of acetaminophen during a 24 hour period and keep in mind that acetaminophen can also be found in many over-the-counter cold medications as well as narcotics that may be given for pain.      You had received oxycodone 5 mg at 5:11 PM. Follow your discharge instructions from Dr. Hayes when you can resume your next dose.       [facility]   Adult Discharge Orders & Instructions     For 24 hours after surgery    Get plenty of rest.  A responsible adult must stay with you for at least 24 hours after you leave the hospital.   Do not drive or use heavy equipment.  If you have weakness or tingling, don't drive or use heavy equipment until this feeling goes away.  Do not drink alcohol.  Avoid strenuous or risky activities.  Ask for help when climbing stairs.   You may feel lightheaded.  IF so, sit for a few minutes before standing.  Have someone help you get up.   If you have nausea (feel sick to your stomach): Drink only clear liquids such as apple juice, ginger ale, broth or 7-Up.  Rest may also help.  Be sure to drink enough fluids.  Move to a regular diet as you feel able.  You may have a slight fever. Call the doctor if your fever is over 100 F (37.7 C) (taken under the tongue) or lasts longer than 24 hours.  You may have a dry mouth, a sore throat, muscle aches or trouble sleeping.  These should go away after 24 hours.  Do not make important or legal decisions.   Call your doctor for any of the followin.  Signs of infection (fever, growing tenderness at the surgery site, a large amount of drainage or bleeding, severe pain, foul-smelling drainage, redness, swelling).    2. It has been over 8 to 10 hours since  surgery and you are still not able to urinate (pass water).    3.  Headache for over 24 hours.

## 2024-06-27 NOTE — ANESTHESIA PREPROCEDURE EVALUATION
"Anesthesia Pre-Procedure Evaluation    Patient: Williams Bowen   MRN: [id number] : 1975        Procedure : Procedure(s):  SURGICAL EXTRACTION OF TEETH # 2, 3, 5          Past Medical History:   Diagnosis Date    Annular tear of intervertebral disc 10/29/2010    Arrhythmia     Asperger's disorder     f/b counselor    ATTN DEFICIT NONHYPERACT 2004    Colon polyp 2009    COPD, mild (H) 2011    Cubital tunnel syndrome 2011    left elbow    Depressive disorder, 2006    f/b psychology    Fidgeting 2011    Former smoker 2010    7 pack year    Gluten enteropathy 2009    Irritable bowel syndrome     evaluated by GI 2004    Rectal polyp     Scoliosis     Underweight       Past Surgical History:   Procedure Laterality Date    CHOLECYSTECTOMY      HC ORCHIOPEXY,INGUNIAL OR SCROTAL APPROACH  1983    SURGICAL HISTORY OF -   2010    Left L5-S1 microdiscectomy with annular repair    UPPER GI ENDOSCOPY  2004    negative including biopsies    ZZHC COLONOSCOPY THRU STOMA W BIOPSY/CAUTERY TUMOR/POLYP/LESION  2009    small rectal polyp removed, biopsies neg      Allergies   Allergen Reactions    Wellbutrin [Bupropion] Rash      Social History     Tobacco Use    Smoking status: Former     Current packs/day: 0.00     Average packs/day: 0.5 packs/day for 14.0 years (7.0 ttl pk-yrs)     Types: Cigarettes     Start date: 1996     Quit date: 2010     Years since quittin.1    Smokeless tobacco: Never    Tobacco comments:     quit via breath laser treatment   Substance Use Topics    Alcohol use: Yes     Comment: \"very, very rarely\"      Wt Readings from Last 1 Encounters:   24 68 kg (150 lb)        Anesthesia Evaluation   Pt has had prior anesthetic. Type: General.    History of anesthetic complications ('asystole' with laparoscopic insufflation previously - spontaneously resolved. No recurrence with subseqent laparoscopic surgery. + strong " "family hx of pseudocholinesterase deficiency (2 aunts, 1 uncle))  - .      ROS/MED HX  ENT/Pulmonary:     (+)                tobacco use, Past use,                    (-) COPD   Neurologic: Comment: Aspergers syndrome, ADD      Cardiovascular:     (+) Dyslipidemia - -   -  - -                        dysrhythmias (sinus bradycardia, stable. No heart block. Cleared per CV),           (-) hypertension   METS/Exercise Tolerance: >4 METS    Hematologic:  - neg hematologic  ROS     Musculoskeletal:  - neg musculoskeletal ROS     GI/Hepatic:    (-) GERD   Renal/Genitourinary:  - neg Renal ROS     Endo:  - neg endo ROS     Psychiatric/Substance Use:     (+) psychiatric history anxiety       Infectious Disease:  - neg infectious disease ROS     Malignancy:  - neg malignancy ROS     Other:            Physical Exam    Airway        Mallampati: II   TM distance: > 3 FB   Neck ROM: full   Mouth opening: > 3 cm    Respiratory Devices and Support         Dental       (+) Modest Abnormalities - crowns, retainers, 1 or 2 missing teeth      Cardiovascular          Rhythm and rate: regular and bradycardia     Pulmonary   pulmonary exam normal                OUTSIDE LABS:  CBC:   Lab Results   Component Value Date    WBC 4.3 03/17/2011    WBC 6.2 01/22/2004    HGB 15.2 08/22/2013    HGB 15.5 03/17/2011    HCT 45.3 03/17/2011    HCT 50.8 01/22/2004     03/17/2011     01/22/2004     BMP:   Lab Results   Component Value Date     05/24/2004     01/22/2004    POTASSIUM 4.6 05/24/2004    POTASSIUM 3.8 01/22/2004    CHLORIDE 105 05/24/2004    CHLORIDE 100 01/22/2004    CO2 29 05/24/2004    CO2 31 01/22/2004    BUN 10 05/24/2004    BUN 13 01/22/2004    CR 0.75 03/17/2009    CR 0.90 05/24/2004    GLC 94 08/22/2013    GLC 93 03/17/2011     COAGS: No results found for: \"PTT\", \"INR\", \"FIBR\"  POC: No results found for: \"BGM\", \"HCG\", \"HCGS\"  HEPATIC:   Lab Results   Component Value Date    ALBUMIN 4.4 05/24/2004    " PROTTOTAL 7.5 05/24/2004    ALT <3 05/24/2004    AST 19 05/24/2004    ALKPHOS 74 05/24/2004    BILITOTAL 0.6 05/24/2004     OTHER:   Lab Results   Component Value Date    TERI 9.8 05/24/2004    AMYLASE 66 01/22/2004    TSH 0.77 08/22/2013    CRP <5.0 06/18/2013    SED 8 11/04/2013       Anesthesia Plan    ASA Status:  2    NPO Status:  NPO Appropriate    Anesthesia Type: General.     - Airway: ETT   Induction: Intravenous, Propofol.   Maintenance: TIVA.        Consents    Anesthesia Plan(s) and associated risks, benefits, and realistic alternatives discussed. Questions answered and patient/representative(s) expressed understanding.     - Discussed:     - Discussed with:  Patient            Postoperative Care    Pain management: Multi-modal analgesia, IV analgesics.   PONV prophylaxis: Ondansetron (or other 5HT-3), Dexamethasone or Solumedrol, Background Propofol Infusion     Comments:    Other Comments: Local per surgeon  Pre-emptive glycopyrrolate prior to intubation  GETA - tape to L (extractions all right upper).   NO SUCCINYLCHOLINE  Decadron 10, zofran 4, TIVA           Gris Mckinnon MD    I have reviewed the pertinent notes and labs in the chart from the past 30 days and (re)examined the patient.  Any updates or changes from those notes are reflected in this note.

## 2025-01-25 ENCOUNTER — HEALTH MAINTENANCE LETTER (OUTPATIENT)
Age: 50
End: 2025-01-25